# Patient Record
Sex: FEMALE | Race: WHITE | NOT HISPANIC OR LATINO
[De-identification: names, ages, dates, MRNs, and addresses within clinical notes are randomized per-mention and may not be internally consistent; named-entity substitution may affect disease eponyms.]

---

## 2020-04-30 ENCOUNTER — MESSAGE (OUTPATIENT)
Age: 45
End: 2020-04-30

## 2020-10-28 ENCOUNTER — TRANSCRIPTION ENCOUNTER (OUTPATIENT)
Age: 45
End: 2020-10-28

## 2022-12-20 PROBLEM — Z00.00 ENCOUNTER FOR PREVENTIVE HEALTH EXAMINATION: Status: ACTIVE | Noted: 2022-12-20

## 2023-04-19 ENCOUNTER — APPOINTMENT (OUTPATIENT)
Dept: ORTHOPEDIC SURGERY | Facility: CLINIC | Age: 48
End: 2023-04-19
Payer: OTHER MISCELLANEOUS

## 2023-04-19 DIAGNOSIS — Z78.9 OTHER SPECIFIED HEALTH STATUS: ICD-10-CM

## 2023-04-19 DIAGNOSIS — S53.105D UNSPECIFIED DISLOCATION OF LEFT ULNOHUMERAL JOINT, SUBSEQUENT ENCOUNTER: ICD-10-CM

## 2023-04-19 DIAGNOSIS — F17.200 NICOTINE DEPENDENCE, UNSPECIFIED, UNCOMPLICATED: ICD-10-CM

## 2023-04-19 PROCEDURE — 99455 WORK RELATED DISABILITY EXAM: CPT

## 2023-04-19 NOTE — HISTORY OF PRESENT ILLNESS
[de-identified] : Patient is following up on her left elbow. pt is here for SLU.   Patient states the elbow is no longer painful.  She admits to loss of ROM but denies any instability or signs of subluxation/dislocation

## 2023-04-19 NOTE — DISCUSSION/SUMMARY
[de-identified] : flexes to 100\par lack 40 degrees of extension\par pronation is full\par supination is 45 degrees\par \par Patient was evaluated for SLU of left elbow in office today\par SLU 70%

## 2023-04-19 NOTE — PHYSICAL EXAM
[Left] : left elbow [5___] : supination 5[unfilled]/5 [] : no erythema [[email or web address]] : ROM   supination 80 pronation 85 degrees.

## 2023-04-23 ENCOUNTER — FORM ENCOUNTER (OUTPATIENT)
Age: 48
End: 2023-04-23

## 2023-11-30 ENCOUNTER — NON-APPOINTMENT (OUTPATIENT)
Age: 48
End: 2023-11-30

## 2024-01-25 ENCOUNTER — NON-APPOINTMENT (OUTPATIENT)
Age: 49
End: 2024-01-25